# Patient Record
Sex: FEMALE | Race: WHITE | Employment: UNEMPLOYED | URBAN - METROPOLITAN AREA
[De-identification: names, ages, dates, MRNs, and addresses within clinical notes are randomized per-mention and may not be internally consistent; named-entity substitution may affect disease eponyms.]

---
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
## 2024-02-08 ENCOUNTER — TELEMEDICINE (OUTPATIENT)
Age: 19
End: 2024-02-08

## 2024-02-08 DIAGNOSIS — K21.9 GASTRO-ESOPHAGEAL REFLUX DISEASE WITHOUT ESOPHAGITIS: ICD-10-CM

## 2024-02-08 DIAGNOSIS — K30 FUNCTIONAL DYSPEPSIA: ICD-10-CM

## 2024-02-08 DIAGNOSIS — K59.04 CHRONIC IDIOPATHIC CONSTIPATION: ICD-10-CM

## 2024-02-08 DIAGNOSIS — R10.84 GENERALIZED ABDOMINAL PAIN: Primary | ICD-10-CM

## 2024-02-08 DIAGNOSIS — R10.84 GENERALIZED ABDOMINAL PAIN: ICD-10-CM

## 2024-02-08 DIAGNOSIS — R11.0 NAUSEA: ICD-10-CM

## 2024-02-08 DIAGNOSIS — K31.84 GASTROPARESIS: ICD-10-CM

## 2024-02-08 DIAGNOSIS — R12 HEARTBURN: ICD-10-CM

## 2024-02-08 PROCEDURE — 99214 OFFICE O/P EST MOD 30 MIN: CPT | Performed by: PEDIATRICS

## 2024-02-08 RX ORDER — HYOSCYAMINE SULFATE 0.12 MG/1
0.12 TABLET SUBLINGUAL EVERY 6 HOURS PRN
Qty: 120 EACH | Refills: 2 | Status: SHIPPED | OUTPATIENT
Start: 2024-02-08

## 2024-02-08 RX ORDER — LUBIPROSTONE 24 UG/1
24 CAPSULE ORAL
Qty: 60 CAPSULE | Refills: 1 | Status: SHIPPED | OUTPATIENT
Start: 2024-02-08

## 2024-02-08 RX ORDER — ONDANSETRON 4 MG/1
4 TABLET, ORALLY DISINTEGRATING ORAL EVERY 8 HOURS PRN
Qty: 30 TABLET | Refills: 1 | Status: SHIPPED | OUTPATIENT
Start: 2024-02-08

## 2024-02-08 NOTE — PROGRESS NOTES
Chief Complaint   Patient presents with    Follow-up     Nauseous and vomiting a little more than usual           
currently on omeprazole 20 mg once daily and Amitiza.  She does have occasional abdominal pain which resolves with Levsin.  She also has occasional nausea but no vomiting reported.  She has stopped Periactin due to increased appetite.  No dysphagia, odynophagia or heartburns reported.  She has reasonable appetite and oral intake with no weight loss.  Bowel movements are once or twice daily, normal in consistency with no diarrhea or gross hematochezia.  She does have significant anxiety and stress.      Medications:  Current Outpatient Medications on File Prior to Visit   Medication Sig Dispense Refill    cetirizine (ZYRTEC) 10 MG tablet Take by mouth      cyproheptadine (PERIACTIN) 4 MG tablet Take 1 tablet by mouth nightly      etonogestrel (NEXPLANON) 68 MG implant Inject into the skin      fludrocortisone (FLORINEF) 0.1 MG tablet Take 1 tablet by mouth daily      hydrOXYzine HCl (ATARAX) 25 MG tablet ceived the following from [facility]: Outside name: hydrOXYzine HCL (ATARAX) 25 mg tablet      Hyoscyamine Sulfate SL 0.125 MG SUBL Place 0.125 mg under the tongue every 6 hours as needed      lubiprostone (AMITIZA) 24 MCG capsule Take 24 mcg by mouth daily (with breakfast)      ondansetron (ZOFRAN-ODT) 4 MG disintegrating tablet Take 4 mg by mouth every 8 hours as needed      pantoprazole (PROTONIX) 20 MG tablet Take 20 mg by mouth daily       No current facility-administered medications on file prior to visit.     ----------    Review Of Systems:    Constitutional:- No significant change in weight, no fatigue.  ENDO:- no diabetes or thyroid disease  CVS:- No history of heart disease, No history of heart murmurs  RESP:- no wheezing, frequent cough or shortness of breath  GI:- See HPI  NEURO:-Normal growth and development.  :-negative for dysuria/micturition problems  Integumentary:- Negative for lesions, rash, and itching.  Musculoskeletal:- Negative for joint pains/edema  Psychiatry:-

## 2024-03-04 RX ORDER — LUBIPROSTONE 24 UG/1
24 CAPSULE ORAL
Qty: 60 CAPSULE | Refills: 1 | Status: SHIPPED | OUTPATIENT
Start: 2024-03-04

## 2024-03-04 RX ORDER — HYOSCYAMINE SULFATE 0.12 MG/1
0.12 TABLET SUBLINGUAL EVERY 6 HOURS PRN
Qty: 120 EACH | Refills: 2 | Status: SHIPPED | OUTPATIENT
Start: 2024-03-04

## 2024-06-24 ENCOUNTER — OFFICE VISIT (OUTPATIENT)
Age: 19
End: 2024-06-24
Payer: COMMERCIAL

## 2024-06-24 ENCOUNTER — PREP FOR PROCEDURE (OUTPATIENT)
Age: 19
End: 2024-06-24

## 2024-06-24 ENCOUNTER — TELEPHONE (OUTPATIENT)
Age: 19
End: 2024-06-24

## 2024-06-24 VITALS
SYSTOLIC BLOOD PRESSURE: 138 MMHG | OXYGEN SATURATION: 96 % | DIASTOLIC BLOOD PRESSURE: 95 MMHG | WEIGHT: 133.25 LBS | BODY MASS INDEX: 19.74 KG/M2 | HEIGHT: 69 IN | HEART RATE: 119 BPM | TEMPERATURE: 97.8 F

## 2024-06-24 DIAGNOSIS — R12 HEARTBURN: ICD-10-CM

## 2024-06-24 DIAGNOSIS — R11.2 NAUSEA AND VOMITING, UNSPECIFIED VOMITING TYPE: ICD-10-CM

## 2024-06-24 DIAGNOSIS — K30 FUNCTIONAL DYSPEPSIA: ICD-10-CM

## 2024-06-24 DIAGNOSIS — R10.84 GENERALIZED ABDOMINAL PAIN: ICD-10-CM

## 2024-06-24 DIAGNOSIS — R11.2 NAUSEA AND VOMITING, UNSPECIFIED VOMITING TYPE: Primary | ICD-10-CM

## 2024-06-24 DIAGNOSIS — R11.0 NAUSEA: ICD-10-CM

## 2024-06-24 PROCEDURE — 99214 OFFICE O/P EST MOD 30 MIN: CPT | Performed by: PEDIATRICS

## 2024-06-24 RX ORDER — OMEPRAZOLE 40 MG/1
40 CAPSULE, DELAYED RELEASE ORAL
Qty: 30 CAPSULE | Refills: 1 | Status: SHIPPED | OUTPATIENT
Start: 2024-06-24

## 2024-06-24 RX ORDER — MIDODRINE HYDROCHLORIDE 2.5 MG/1
2.5 TABLET ORAL DAILY PRN
COMMUNITY
Start: 2024-05-31 | End: 2025-05-31

## 2024-06-24 RX ORDER — AMITRIPTYLINE HYDROCHLORIDE 10 MG/1
10 TABLET, FILM COATED ORAL NIGHTLY
COMMUNITY
Start: 2024-05-31 | End: 2025-05-31

## 2024-06-24 RX ORDER — OMEPRAZOLE 20 MG/1
CAPSULE, DELAYED RELEASE ORAL
COMMUNITY
End: 2024-06-24

## 2024-06-24 NOTE — PATIENT INSTRUCTIONS
Continue with Amitiza  Increase fiber and water intake  Increase omeprazole 40 mg once daily  Levsin as needed for abdominal pain  Zofran as needed for nausea  Schedule gastric emptying study   Schedule EGD with botox injection in 4 weeks. Cancel if improving  Fecal calprotectin  Follow-up with psychology  Consider transition to adult GI: Phill Serrano, Philippe Lal Talreja   Follow-up in 2 months       Office contact number: [phone]  Outpatient lab Location: 3rd floor, [address]  Same day X ray: Please go to outpatient registration in ground floor for guidance  Scheduling Image: Please call [phone] to schedule any imaging

## 2024-06-24 NOTE — TELEPHONE ENCOUNTER
Patient stopped by office to schedule procedure date of 7/24/2024    EGD [23572] and ANTRAL Botox [83894] added to 7/24/2024 in Surgical Scheduling      Patient declined PHI

## 2024-06-24 NOTE — H&P (VIEW-ONLY)
Prior Clinic Visit:  2/8/2024     ----------    Background History:    Micaela Weldon is a 19 y.o. female being seen today in pediatric GI clinic secondary to issues with constipation and GERD s/p Nissen fundoplication.  She had esophageal manometry which showed hypotonic lower esophageal sphincter and subsequently underwent fundoplication with improvement in GERD symptoms.  She also has constipation with no response to MiraLAX or Ex-Lax needing hospitalization for NG tube bowel cleanout.  She was recently started on Amitiza.  Past medical history is also significant for POTS.  She had  EGD and flexible sigmoidoscopy with biopsy in June 2020 which were grossly normal.  Biopsy showed minimal reflux changes in distal esophagus otherwise within normal limits.  She had upper GI series following Nissen fundoplication due to persistent nausea and vomiting which was within normal limits with no evidence of hernia or prolapse of the Nissen fundoplication.  She was subsequently started on Periactin for possible functional dyspepsia.  She had gastric emptying study which showed delayed gastric emptying.  She was eventually started on erythromycin but had significant nausea and vomiting so it was discontinued.  She was eventually started on Reglan after discussion of side effects.  She did well with Reglan but started having muscle spasms so subsequently stopped.    During the last visit, recommended the following:    Continue with Amitiza  Increase fiber and water intake  Continue with omeprazole 20 mg once daily  Levsin as needed for abdominal pain  Zofran as needed for nausea  Fecal calprotectin  Follow-up with psychology  Follow-up in 4 months       Portions of the above background history were copied from the prior visit documentation on 2/8/2024  and were confirmed with the patient and updated to reflect details from today's visit, 06/24/24      Interval History:    History provided by patient. Since the last visit, she

## 2024-07-12 ENCOUNTER — TELEPHONE (OUTPATIENT)
Age: 19
End: 2024-07-12

## 2024-07-12 RX ORDER — LUBIPROSTONE 24 UG/1
24 CAPSULE ORAL
Qty: 60 CAPSULE | Refills: 1 | Status: SHIPPED | OUTPATIENT
Start: 2024-07-12

## 2024-07-20 LAB — CALPROTECTIN STL-MCNT: 72 UG/G (ref 0–120)

## 2024-07-24 ENCOUNTER — HOSPITAL ENCOUNTER (OUTPATIENT)
Facility: HOSPITAL | Age: 19
Setting detail: OUTPATIENT SURGERY
Discharge: HOME | End: 2024-07-24
Attending: PEDIATRICS | Admitting: PEDIATRICS
Payer: COMMERCIAL

## 2024-07-24 ENCOUNTER — ANESTHESIA (OUTPATIENT)
Facility: HOSPITAL | Age: 19
End: 2024-07-24
Payer: COMMERCIAL

## 2024-07-24 ENCOUNTER — ANESTHESIA EVENT (OUTPATIENT)
Facility: HOSPITAL | Age: 19
End: 2024-07-24
Payer: COMMERCIAL

## 2024-07-24 VITALS
WEIGHT: 136.47 LBS | HEART RATE: 97 BPM | HEIGHT: 64 IN | RESPIRATION RATE: 18 BRPM | SYSTOLIC BLOOD PRESSURE: 120 MMHG | TEMPERATURE: 97.8 F | DIASTOLIC BLOOD PRESSURE: 89 MMHG | OXYGEN SATURATION: 100 % | BODY MASS INDEX: 23.3 KG/M2

## 2024-07-24 LAB — HCG UR QL: NEGATIVE

## 2024-07-24 PROCEDURE — 7100000001 HC PACU RECOVERY - ADDTL 15 MIN: Performed by: PEDIATRICS

## 2024-07-24 PROCEDURE — 43236 UPPR GI SCOPE W/SUBMUC INJ: CPT | Performed by: PEDIATRICS

## 2024-07-24 PROCEDURE — 3600000002 HC SURGERY LEVEL 2 BASE: Performed by: PEDIATRICS

## 2024-07-24 PROCEDURE — 7100000000 HC PACU RECOVERY - FIRST 15 MIN: Performed by: PEDIATRICS

## 2024-07-24 PROCEDURE — 2500000003 HC RX 250 WO HCPCS: Performed by: NURSE ANESTHETIST, CERTIFIED REGISTERED

## 2024-07-24 PROCEDURE — 81025 URINE PREGNANCY TEST: CPT

## 2024-07-24 PROCEDURE — 88305 TISSUE EXAM BY PATHOLOGIST: CPT

## 2024-07-24 PROCEDURE — 2709999900 HC NON-CHARGEABLE SUPPLY: Performed by: PEDIATRICS

## 2024-07-24 PROCEDURE — 6360000002 HC RX W HCPCS: Performed by: PEDIATRICS

## 2024-07-24 PROCEDURE — 43239 EGD BIOPSY SINGLE/MULTIPLE: CPT | Performed by: PEDIATRICS

## 2024-07-24 PROCEDURE — 2580000003 HC RX 258: Performed by: NURSE ANESTHETIST, CERTIFIED REGISTERED

## 2024-07-24 PROCEDURE — 3700000000 HC ANESTHESIA ATTENDED CARE: Performed by: PEDIATRICS

## 2024-07-24 PROCEDURE — 3700000001 HC ADD 15 MINUTES (ANESTHESIA): Performed by: PEDIATRICS

## 2024-07-24 PROCEDURE — 3600000012 HC SURGERY LEVEL 2 ADDTL 15MIN: Performed by: PEDIATRICS

## 2024-07-24 PROCEDURE — 6360000002 HC RX W HCPCS: Performed by: NURSE ANESTHETIST, CERTIFIED REGISTERED

## 2024-07-24 RX ORDER — SODIUM CHLORIDE 9 MG/ML
INJECTION, SOLUTION INTRAVENOUS CONTINUOUS
Status: CANCELLED | OUTPATIENT
Start: 2024-07-24

## 2024-07-24 RX ORDER — SODIUM CHLORIDE 0.9 % (FLUSH) 0.9 %
5-40 SYRINGE (ML) INJECTION PRN
Status: CANCELLED | OUTPATIENT
Start: 2024-07-24

## 2024-07-24 RX ORDER — LIDOCAINE HYDROCHLORIDE 20 MG/ML
INJECTION, SOLUTION EPIDURAL; INFILTRATION; INTRACAUDAL; PERINEURAL PRN
Status: DISCONTINUED | OUTPATIENT
Start: 2024-07-24 | End: 2024-07-24 | Stop reason: SDUPTHER

## 2024-07-24 RX ORDER — SODIUM CHLORIDE 9 MG/ML
25 INJECTION, SOLUTION INTRAVENOUS PRN
Status: CANCELLED | OUTPATIENT
Start: 2024-07-24

## 2024-07-24 RX ORDER — SODIUM CHLORIDE 0.9 % (FLUSH) 0.9 %
5-40 SYRINGE (ML) INJECTION EVERY 12 HOURS SCHEDULED
Status: CANCELLED | OUTPATIENT
Start: 2024-07-24

## 2024-07-24 RX ORDER — SODIUM CHLORIDE, SODIUM LACTATE, POTASSIUM CHLORIDE, CALCIUM CHLORIDE 600; 310; 30; 20 MG/100ML; MG/100ML; MG/100ML; MG/100ML
INJECTION, SOLUTION INTRAVENOUS CONTINUOUS PRN
Status: DISCONTINUED | OUTPATIENT
Start: 2024-07-24 | End: 2024-07-24 | Stop reason: SDUPTHER

## 2024-07-24 RX ADMIN — LIDOCAINE HYDROCHLORIDE 80 MG: 20 INJECTION, SOLUTION EPIDURAL; INFILTRATION; INTRACAUDAL; PERINEURAL at 11:26

## 2024-07-24 RX ADMIN — PROPOFOL 350 MCG/KG/MIN: 10 INJECTION, EMULSION INTRAVENOUS at 11:27

## 2024-07-24 RX ADMIN — PROPOFOL 50 MG: 10 INJECTION, EMULSION INTRAVENOUS at 11:29

## 2024-07-24 RX ADMIN — PROPOFOL 100 MG: 10 INJECTION, EMULSION INTRAVENOUS at 11:26

## 2024-07-24 RX ADMIN — PROPOFOL 50 MG: 10 INJECTION, EMULSION INTRAVENOUS at 11:32

## 2024-07-24 RX ADMIN — SODIUM CHLORIDE, SODIUM LACTATE, POTASSIUM CHLORIDE, AND CALCIUM CHLORIDE: 600; 310; 30; 20 INJECTION, SOLUTION INTRAVENOUS at 11:15

## 2024-07-24 RX ADMIN — PROPOFOL 50 MG: 10 INJECTION, EMULSION INTRAVENOUS at 11:28

## 2024-07-24 ASSESSMENT — PAIN SCALES - GENERAL: PAINLEVEL_OUTOF10: 0

## 2024-07-24 ASSESSMENT — PAIN - FUNCTIONAL ASSESSMENT: PAIN_FUNCTIONAL_ASSESSMENT: NONE - DENIES PAIN

## 2024-07-24 NOTE — ANESTHESIA POSTPROCEDURE EVALUATION
Department of Anesthesiology  Postprocedure Note    Patient: Micaela Weldon  MRN: [id number]  YOB: 2005  Date of evaluation: 7/24/2024    Procedure Summary       Date: 07/24/24 Room / Location: [facility] ASU  / [facility] AMBULATORY OR    Anesthesia Start: 1121 Anesthesia Stop: 1151    Procedure: ESOPHAGOGASTRODUODENOSCOPY WITH BIOPSY WITH INTRAPYLORIC BOTOX INJECTION (Upper GI Region) Diagnosis:       Nausea and vomiting, unspecified vomiting type      (Nausea and vomiting, unspecified vomiting type [R11.2])    Surgeons: Bin Pimentel MD Responsible Provider: Daisha Beckman MD    Anesthesia Type: MAC ASA Status: 2            Anesthesia Type: MAC    Ciera Phase I: Ciera Score: 10    Ciera Phase II:      Anesthesia Post Evaluation    Patient location during evaluation: PACU  Level of consciousness: awake  Airway patency: patent  Nausea & Vomiting: no nausea  Cardiovascular status: hemodynamically stable  Respiratory status: acceptable  Hydration status: stable  Comments: --------------------            07/24/24               1230     --------------------   BP:       120/89     Pulse:               Resp:       18       Temp:                SpO2:      100%     --------------------   Pain management: adequate    No notable events documented.

## 2024-07-24 NOTE — INTERVAL H&P NOTE
Update History & Physical    The patient's History and Physical of June 24, 2024 was reviewed with the patient and I examined the patient. There was no change. The surgical site was confirmed by the patient and me.     Plan: The risks, benefits, expected outcome, and alternative to the recommended procedure have been discussed with the patient. Patient understands and wants to proceed with the procedure.     Electronically signed by Bin Pimentel MD on 7/24/2024 at 11:08 AM

## 2024-07-24 NOTE — DISCHARGE INSTRUCTIONS
[facility]  [address]  [place] [address]  [phone]          Micaela Weldon  [id number]  2005    UPPER ENDOSCOPY DISCHARGE INSTRUCTIONS  Discomfort:  Redness at IV site- apply warm compress to area; if redness or soreness persist- contact your physician  There may be a slight amount of blood if there is vomiting      DIET:  Current diet     MEDICATIONS:    Resume home medications     ACTIVITY:  Responsible adult should stay with child today.  You may resume your normal daily activities it is recommended that you spend the remainder of the day resting -  avoid any strenuous activity.  No driving for 24 hours    CALL M.D.  ANY SIGN OF:   Increasing pain, nausea, vomiting  Abdominal distension (swelling)  Significant blood in vomit or bilious vomiting or several episodes of vomiting   Fever (chills)       Follow-up Instructions:  Call [facility] if any questions or problems.Telephone # [phone]      Learning About Coronavirus (COVID-19)  Coronavirus (COVID-19): Overview  What is coronavirus (COVID-19)?  The coronavirus disease (COVID-19) is caused by a virus. It is an illness that was first found in [facility], in December 2019. It has since spread worldwide.  The virus can cause fever, cough, and trouble breathing. In severe cases, it can cause pneumonia and make it hard to breathe without help. It can cause death.  Coronaviruses are a large group of viruses. They cause the common cold. They also cause more serious illnesses like Middle East respiratory syndrome (MERS) and severe acute respiratory syndrome (SARS). COVID-19 is caused by a novel coronavirus. That means it's a new type that has not been seen in people before.  This virus spreads person-to-person through droplets from coughing and sneezing. It can also spread when you are close to someone who is infected. And it can spread when you touch something that has the virus on it, such  clearly.  Your face and lips have a blue color.  You pass out (lose consciousness) or are very hard to wake up.  Call your doctor now if you develop symptoms such as:  Shortness of breath.  Fever.  Cough.  If you need to get care, call ahead to the doctor's office for instructions before you go. Make sure you wear a face mask, if you have one, to prevent exposing other people to the virus.  Where can you get the latest information?  The following health organizations are tracking and studying this virus. Their websites contain the most up-to-date information. You'll also learn what to do if you think you may have been exposed to the virus.  U.S. Centers for Disease Control and Prevention (CDC): The CDC provides updated news about the disease and travel advice. The website also tells you how to prevent the spread of infection. www.cdc.gov  World Health Organization (WHO): WHO offers information about the virus outbreaks. WHO also has travel advice. www.who.int  Current as of: April 1, 2020               Content Version: 12.4  © 2006-2020 [employer].   Care instructions adapted under license by your healthcare professional. If you have questions about a medical condition or this instruction, always ask your healthcare professional. [employer] disclaims any warranty or liability for your use of this information.

## 2024-07-24 NOTE — OP NOTE
[facility]  [address]  [place] [address] [phone]      Esophagogastroduodenoscopy Procedure Note    Micaela Weldon  2005  [id number]    Procedure: Esophagogastroduodenoscopy with biopsy    Pre-operative Diagnosis: nausea / vomiting     Post-operative Diagnosis: Mild gastritis / s/p intrapyloric botox injection     : Bin Pimentel MD    Assistant Surgeons: none    Referring Provider:  Antoinette Martinez MD    Anesthesia/Sedation: Sedation provided by the Anesthesia team. - General anesthesia     Pre-Procedural Exam:  Heart: RRR, without gallops or rubs  Lungs: clear bilaterally without wheezes, crackles, or rhonchi  Abdomen: soft, nontender, nondistended, bowel sounds present  Mental Status: awake, alert      Procedure Details   After satisfactory titration of sedation, endoscope was successfully advanced through the oropharynx under direct visualization into the esophagus without difficulty.  The endoscope was then advanced throughout the entire length of the esophagus into the stomach where a pool of non-bloody, non-bilious gastric fluids was aspirated.  The endoscope was advanced along the greater curvature of the stomach into the antrum.  The pylorus was identified and easily intubated.  The endoscope was then advanced into the 2nd/3rd portion of the duodenum.  Biopsies were obtained from the duodenum, the body of the stomach, proximal esophagus and distal esophagus. 100 international units of botox diluted, divided and injected into 4 quadrants of pylorus. The stomach was decompressed and the endoscope was retracted fully.    Findings:   Esophagus:normal  GE junction: 35 cm from the incisors; Regular  Stomach:mild erythema seen in gastric antrum   Duodenum/jejunum:normal    Therapies:  See above   Implants:  none    Specimens:   Antrum - 2  Gastric body - 2  Duodenum - 2  Distal esophagus - 2  Proximal esophagus - 2           Estimated Blood

## 2024-07-24 NOTE — PERIOP NOTE
I have reviewed discharge instructions with the  boyfriend-Calos.  The  boyfriend-Calos verbalized understanding. All questions addressed at this time. A paper copy of these instructions have been given to the patient to take home.

## 2024-07-24 NOTE — ANESTHESIA PRE PROCEDURE
Department of Anesthesiology  Preprocedure Note       Name:  Micaela Weldon   Age:  19 y.o.  :  2005                                          MRN:  [id number]         Date:  2024      Surgeon: Surgeon(s):  Bin Pimentel MD    Procedure: Procedure(s):  ESOPHAGOGASTRODUODENOSCOPY WITH BIOPSY WITH INTRAPYLORIC BOTOX INJECTION    Medications prior to admission:   Prior to Admission medications    Medication Sig Start Date End Date Taking? Authorizing Provider   lubiprostone (AMITIZA) 24 MCG capsule Take 1 capsule by mouth daily (with breakfast) 24   Bin Pimentel MD   amitriptyline (ELAVIL) 10 MG tablet Take 1 tablet by mouth nightly 24  ProviderJuana MD   midodrine (PROAMATINE) 2.5 MG tablet Take 1 tablet by mouth daily as needed 24  Juana Jimenez MD   omeprazole (PRILOSEC) 40 MG delayed release capsule Take 1 capsule by mouth every morning (before breakfast) 24   Bin Pimentel MD   Hyoscyamine Sulfate SL 0.125 MG SUBL Place 0.125 mg under the tongue every 6 hours as needed (abdominal pain) 3/4/24   Bin Pimentel MD   ondansetron (ZOFRAN-ODT) 4 MG disintegrating tablet Take 1 tablet by mouth every 8 hours as needed for Nausea 24   Bin Pimentel MD   cetirizine (ZYRTEC) 10 MG tablet Take by mouth    Automatic Reconciliation, Ar   cyproheptadine (PERIACTIN) 4 MG tablet Take 1 tablet by mouth nightly  Patient not taking: Reported on 22   Automatic Reconciliation, Ar   etonogestrel (NEXPLANON) 68 MG implant Inject into the skin    Automatic Reconciliation, Ar   fludrocortisone (FLORINEF) 0.1 MG tablet Take 1 tablet by mouth daily  Patient not taking: Reported on 2024   Automatic Reconciliation, Ar   hydrOXYzine HCl (ATARAX) 25 MG tablet ceived the following from [facility]: Outside name: hydrOXYzine HCL (ATARAX) 25 mg tablet 21

## 2024-08-12 ENCOUNTER — TELEMEDICINE (OUTPATIENT)
Age: 19
End: 2024-08-12
Payer: COMMERCIAL

## 2024-08-12 DIAGNOSIS — K59.04 FUNCTIONAL CONSTIPATION: Primary | ICD-10-CM

## 2024-08-12 DIAGNOSIS — K31.84 GASTROPARESIS: ICD-10-CM

## 2024-08-12 DIAGNOSIS — R10.84 GENERALIZED ABDOMINAL PAIN: ICD-10-CM

## 2024-08-12 DIAGNOSIS — R11.2 NAUSEA AND VOMITING, UNSPECIFIED VOMITING TYPE: ICD-10-CM

## 2024-08-12 DIAGNOSIS — K30 FUNCTIONAL DYSPEPSIA: ICD-10-CM

## 2024-08-12 DIAGNOSIS — R12 HEARTBURN: ICD-10-CM

## 2024-08-12 PROCEDURE — 99214 OFFICE O/P EST MOD 30 MIN: CPT | Performed by: PEDIATRICS

## 2024-08-12 RX ORDER — OMEPRAZOLE 40 MG/1
40 CAPSULE, DELAYED RELEASE ORAL
Qty: 30 CAPSULE | Refills: 1 | Status: SHIPPED | OUTPATIENT
Start: 2024-08-12

## 2024-08-12 RX ORDER — ONDANSETRON 4 MG/1
4 TABLET, ORALLY DISINTEGRATING ORAL EVERY 8 HOURS PRN
Qty: 30 TABLET | Refills: 1 | Status: SHIPPED | OUTPATIENT
Start: 2024-08-12

## 2024-08-12 RX ORDER — LUBIPROSTONE 24 UG/1
24 CAPSULE ORAL
Qty: 60 CAPSULE | Refills: 3 | Status: SHIPPED | OUTPATIENT
Start: 2024-08-12

## 2024-08-12 ASSESSMENT — PATIENT HEALTH QUESTIONNAIRE - PHQ9
SUM OF ALL RESPONSES TO PHQ QUESTIONS 1-9: 0
SUM OF ALL RESPONSES TO PHQ QUESTIONS 1-9: 0
1. LITTLE INTEREST OR PLEASURE IN DOING THINGS: NOT AT ALL
SUM OF ALL RESPONSES TO PHQ QUESTIONS 1-9: 0
2. FEELING DOWN, DEPRESSED OR HOPELESS: NOT AT ALL
SUM OF ALL RESPONSES TO PHQ9 QUESTIONS 1 & 2: 0
SUM OF ALL RESPONSES TO PHQ QUESTIONS 1-9: 0

## 2024-08-12 NOTE — PROGRESS NOTES
Prior Clinic Visit:  6/24/2024       ----------    Background History:    Micaela Weldon is a 19 y.o. female being seen today in pediatric GI clinic secondary to issues with constipation and GERD s/p Nissen fundoplication.  She had esophageal manometry which showed hypotonic lower esophageal sphincter and subsequently underwent fundoplication with improvement in GERD symptoms.  She also has constipation with no response to MiraLAX or Ex-Lax needing hospitalization for NG tube bowel cleanout.  She was recently started on Amitiza.  Past medical history is also significant for POTS.  She had  EGD and flexible sigmoidoscopy with biopsy in June 2020 which were grossly normal.  Biopsy showed minimal reflux changes in distal esophagus otherwise within normal limits.  She had upper GI series following Nissen fundoplication due to persistent nausea and vomiting which was within normal limits with no evidence of hernia or prolapse of the Nissen fundoplication.  She was subsequently started on Periactin for possible functional dyspepsia.  She had gastric emptying study which showed delayed gastric emptying.  She was eventually started on erythromycin but had significant nausea and vomiting so it was discontinued.  She was eventually started on Reglan after discussion of side effects.  She did well with Reglan but started having muscle spasms so subsequently stopped.  Due to worsening of symptoms, she had EGD with biopsy and intra pyloric Botox injection in July 2024.  EGD showed mild gastritis otherwise within normal limits.  Biopsy showed mild reflux type changes in distal esophagus.  Biopsy also showed focal mild dilated lymphatics in duodenal biopsies with unclear clinical significance in absence of lymphopenia and hypoalbuminemia.    During the last visit, recommended the following:    Continue with Amitiza  Increase fiber and water intake  Increase omeprazole 40 mg once daily  Levsin as needed for abdominal pain  Zofran

## 2024-12-02 DIAGNOSIS — K21.9 GASTRO-ESOPHAGEAL REFLUX DISEASE WITHOUT ESOPHAGITIS: Primary | ICD-10-CM

## 2024-12-02 NOTE — TELEPHONE ENCOUNTER
omeprazole (PRILOSEC) 40 MG delayed release capsule     [facility] is calling to request a refill on the above medication. Please advise    [facility]  [address] [place] [address]  Phone # [phone]

## 2024-12-03 RX ORDER — OMEPRAZOLE 40 MG/1
40 CAPSULE, DELAYED RELEASE ORAL
Qty: 30 CAPSULE | Refills: 1 | Status: SHIPPED | OUTPATIENT
Start: 2024-12-03

## 2025-01-29 RX ORDER — ONDANSETRON 4 MG/1
4 TABLET, ORALLY DISINTEGRATING ORAL EVERY 8 HOURS PRN
Qty: 20 TABLET | Refills: 0 | Status: SHIPPED | OUTPATIENT
Start: 2025-01-29

## 2025-01-29 NOTE — TELEPHONE ENCOUNTER
ondansetron (ZOFRAN-ODT) 4 MG disintegrating tablet       [facility] is calling to request a refill on the above medication. Please advise      [facility] #  [phone]

## (undated) DEVICE — FORCEPS BX L240CM JAW DIA2.8MM L CAP W/ NDL MIC MESH TOOTH

## (undated) DEVICE — FORCEPS BX L240CM JAW DIA2.4MM ORNG L CAP W/ NDL DISP RAD

## (undated) DEVICE — Device

## (undated) DEVICE — SYR 10ML LUER LOK 1/5ML GRAD --

## (undated) DEVICE — BASIN EMSIS 16OZ GRAPHITE PLAS KID SHP MOLD GRAD FOR ORAL

## (undated) DEVICE — SYRINGE 50ML E/T

## (undated) DEVICE — BITE BLOCK ENDOSCP AD 60 FR W/ ADJ STRP PLAS GRN BLOX

## (undated) DEVICE — COLON KIT WITH 1.1 OZ ORCA HYDRA SEAL 2 GOWN

## (undated) DEVICE — CATH REFLX PH Z IMPED 1CH 6.4F -- VERSAFLEX

## (undated) DEVICE — Device: Brand: SINGLE USE INJECTOR NM600/610

## (undated) DEVICE — STRAP,POSITIONING,KNEE/BODY,FOAM,4X60": Brand: MEDLINE

## (undated) DEVICE — TUBING HYDR IRR --